# Patient Record
Sex: FEMALE | Race: ASIAN
[De-identification: names, ages, dates, MRNs, and addresses within clinical notes are randomized per-mention and may not be internally consistent; named-entity substitution may affect disease eponyms.]

---
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
## 2020-08-10 ENCOUNTER — HOSPITAL ENCOUNTER (EMERGENCY)
Dept: HOSPITAL 5 - ED | Age: 30
Discharge: HOME | End: 2020-08-10
Payer: COMMERCIAL

## 2020-08-10 VITALS — DIASTOLIC BLOOD PRESSURE: 62 MMHG | SYSTOLIC BLOOD PRESSURE: 104 MMHG
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
## 2020-08-10 DIAGNOSIS — Z79.1: ICD-10-CM

## 2020-08-10 DIAGNOSIS — Z79.899: ICD-10-CM

## 2020-08-10 DIAGNOSIS — S99.921D: Primary | ICD-10-CM

## 2020-08-10 DIAGNOSIS — Z48.02: ICD-10-CM

## 2020-08-10 PROCEDURE — 99282 EMERGENCY DEPT VISIT SF MDM: CPT

## 2020-08-10 NOTE — EMERGENCY DEPARTMENT REPORT
Suture/Staple Removal





- [facility]


Chief Complaint: Laceration/Recheck/Suture


Stated Complaint: REMOVE STITCHES


Time Seen by Provider: 08/10/20 15:21


When Sutures or Staples Placed: 11-14 Days Ago


Wound Location: left elbow





ED Review of Systems


ROS: 


Stated complaint: REMOVE STITCHES


Other details as noted in HPI





Constitutional: denies: chills, fever


Eyes: denies: eye pain, eye discharge, vision change


ENT: denies: ear pain, throat pain


Respiratory: denies: cough, shortness of breath, wheezing


Cardiovascular: denies: chest pain, palpitations


Endocrine: no symptoms reported


Gastrointestinal: denies: abdominal pain, nausea, diarrhea


Genitourinary: denies: urgency, dysuria, discharge


Musculoskeletal: denies: back pain, joint swelling, arthralgia


Skin: denies: rash, lesions


Neurological: denies: headache, weakness, paresthesias


Psychiatric: denies: anxiety, depression


Hematological/Lymphatic: denies: easy bleeding, easy bruising





ED Past Medical Hx





- Past Medical History


Previous Medical History?: No





- Surgical History


Past Surgical History?: No





- Social History


Smoking Status: Never Smoker


Substance Use Type: None





- Medications


Home Medications: 


                                Home Medications











 Medication  Instructions  Recorded  Confirmed  Last Taken  Type


 


HYDROcodone/APAP 5-325 [[place] 1 each PO Q6HR PRN #10 tablet 07/29/20  Unknown Rx





5/325]     


 


Ibuprofen [Motrin 600 MG tab] 600 mg PO Q8H PRN #14 tablet 07/29/20  Unknown Rx


 


Neomycin/Bacitracin/Polymyxinb 1 applicatio TP BID #28 oint...g. 07/29/20  

Unknown Rx





[Triple Antibiotic Ointment]     


 


Sulfamethoxazole/Trimethoprim 1 each PO BID 7 Days #14 tablet 07/29/20  Unknown 

Rx





[Bactrim DS TAB]     














Suture Removal Exam





- Exam


General: 


Vital signs noted. No distress. Alert and acting appropriately.





Wound: No Pathologic Erythema, No Tenderness, No Drainage, No Pus, No Wound 

Dehiscence


Other Systems: 


All other systems reviewed and are unremarkable.








ED Course





- Reevaluation(s)


Reevaluation #1: 





08/10/20 15:24


Patient is speaking in full sentences with no signs of distress noted.





ED Recheck MDM





- Medical Decision Making





Well healing.  3 sutures has been removed.  Patient was instructed to Follow-up 

with a primary care doctor in 3-5 days or if symptoms worsen and continue return

to emergency room as soon as possible.  At time of discharge, the patient does 

not seem toxic or ill in appearance.  No acute signs of distress noted.  Patient

agrees to discharge treatment plan of care.  No further questions noted by the 

patient.





Critical care attestation.: 


If time is entered above; I have spent that time in minutes in the direct care 

of this critically ill patient, excluding procedure time.








ED Disposition


Clinical Impression: 


 Encounter for removal of sutures





Disposition: DC-01 TO HOME OR SELFCARE


Is pt being admited?: No


Does the pt Need Aspirin: No


Condition: Stable


Instructions:  Suture Removal (ED)


Additional Instructions: 


Follow-up with a primary care doctor in 3-5 days or if symptoms worsen and 

continue return to emergency room as soon as possible. 


Referrals: 


PRIMARY CARE,MD [Referring] - 3-5 Days


JUSITN ARREGUIN MD [Staff Physician] - 3-5 Days


Forms:  Work/School Release Form(ED)